# Patient Record
Sex: FEMALE | Race: WHITE
[De-identification: names, ages, dates, MRNs, and addresses within clinical notes are randomized per-mention and may not be internally consistent; named-entity substitution may affect disease eponyms.]

---

## 2017-10-17 ENCOUNTER — HOSPITAL ENCOUNTER (OUTPATIENT)
Dept: HOSPITAL 33 - SDC | Age: 72
Discharge: HOME | End: 2017-10-17
Attending: OPHTHALMOLOGY
Payer: MEDICARE

## 2017-10-17 VITALS — DIASTOLIC BLOOD PRESSURE: 65 MMHG | HEART RATE: 65 BPM | SYSTOLIC BLOOD PRESSURE: 104 MMHG

## 2017-10-17 VITALS — OXYGEN SATURATION: 94 %

## 2017-10-17 DIAGNOSIS — H25.9: Primary | ICD-10-CM

## 2017-10-17 PROCEDURE — 66982 XCAPSL CTRC RMVL CPLX WO ECP: CPT

## 2017-10-17 PROCEDURE — 67005 PARTIAL REMOVAL OF EYE FLUID: CPT

## 2017-10-17 PROCEDURE — 08RK3JZ REPLACEMENT OF LEFT LENS WITH SYNTHETIC SUBSTITUTE, PERCUTANEOUS APPROACH: ICD-10-PCS | Performed by: OPHTHALMOLOGY

## 2017-10-17 PROCEDURE — 00142 ANES PX ON EYE LENS SURGERY: CPT

## 2017-10-17 PROCEDURE — 99100 ANES PT EXTEME AGE<1 YR&>70: CPT

## 2017-10-17 PROCEDURE — 08B53ZZ EXCISION OF LEFT VITREOUS, PERCUTANEOUS APPROACH: ICD-10-PCS | Performed by: OPHTHALMOLOGY

## 2017-10-17 NOTE — OP
DATE/TIME OF OPERATION:   10/17/2017  1034

TIME DICTATED:   1151

PREOPERATIVE DIAGNOSIS:    Senile cataract of left eye.  

POSTOPERATIVE DIAGNOSIS:  Senile cataract of left eye.  

SURGEON:  Milo Godinez MD

ASSISTANT:  None.

 OPERATION:  Cataract extraction of left eye with an intraocular lens implant. 

               STANDARD _____   COMPLEX __X____

ANESTHESIA:  MAC.

___X___  Monitored anesthesia care in combination with topical and intra-cameral 
anesthesia (because of the established specific risk of reflux, arrhythmias, or an anxiety 
attack associated with ocular manipulation as well as difficulty of the ophthalmologist to 
manage such potentially catastrophic events while simultaneously attempting to complete 
the surgical procedure, it was deemed necessary for the patient's safety to have an 
anesthesiologist or a nurse anesthetist present during the procedure whenever possible. 
The anesthesiologist or the nurse anesthetist was utilized to monitor and regulate the 
intravenous sedation of the patient, so the patient was cooperative, relaxed, and 
comfortable).

______ Topical anesthesia using Tetracaine eye drops together with intra cameral 
anesthesia using Lidocaine 1% MPF. The nurse was utilized to monitor the patient. 

ANESTHESIA PROVIDER:  Kvng Burks CRNA.

COMPLICATIONS:  None.

BLOOD LOSS:  None.

INDICATIONS:  The patient is undergoing cataract surgery in the hopes of eliminating the 
visual complaints and difficulty. 

PROCEDURE:  After arriving at the facility's outpatient surgery area, an IV was started; 
the patient was given 5 mg of p.o. Versed. (If an anesthesia provider was not monitoring 
the patient) The patient was then given topical anesthetic Tetracaine eye drops. A cotton 
pellet was soaked into a solution of a combination of Zymaxid 0.5%, Kolby-Synephrine 2.5% 
and Ocufen (other drops might have been substituted referenced in the patient's record). 
The pellet was inserted by the RN into the lower conjunctival cul-de-sac with a sterile 
forceps and left for 20 minutes. The pellet was then removed by the RN with a sterile 
forceps before taking the patient to the operating room. The preoperative area nurse 
identified the patient and marked the correct eye to be operated on.

 I identified the correct eye to be operated on and marked it appropriately in the 
outpatient surgery area.

The patient was then taken into the operating room. Tetracaine eye drops were installed 
again in the correct eye. The eyelids and the lashes and the lid margins were scrubbed 
with Betadine solution. One drop of the diluted Betadine solution was placed in the 
conjunctival cul-de-sac for 45 seconds and then was irrigated. A drop of Tetracaine Gel 
was placed in the conjunctival cul-de-sac. The patient's forehead was taped to secure it 
during the procedure. The patient was monitored.  

The patient was then draped in the usual way for this procedure. An eye speculum was used 
to separate the eyelids. The eye was then fixated and a temporal 2.5 mm incision was made 
in the clear cornea temporally at the limbus. Through the incision, 0.25 cc of 1% 
non-preserved lidocaine was injected into the anterior chamber for intracameral 
anesthesia. The anterior chamber was then filled with viscoelastic. 

_____ The pupil was small. I felt that it would be safer to mechanically dilate the 
pupil.  A Malyugin ring was used at this point which dilated the pupil. That was removed 
at the end of the procedure prior to aspiration of the viscoelastic from the anterior 
chamber and posterior to the intraocular lens implant.

_____ The cataract had a great amount of cortical changes. That rendered seeing the 
anterior capsule difficult for a safe performance of an anterior capsulotomy. I injected 
an air bubble into the anterior chamber. I then injected 1 ML of vision blue solution into 
the anterior chamber. The vision blue solution was irrigated from the anterior chamber 
after 30 seconds. The anterior capsule was stained which facilitated performing the 
anterior capsulotomy safely. 

 After that was completed, a cystotome was introduced into the anterior chamber and a 
round anterior capsulotomy was performed. The capsule was removed by a forceps.

Hydrodissection was next carried utilizing a 25-gauge cannula and balanced salt solution 
to delineate the cortical material from the capsule and the nucleus from the cortical 
material. The nucleus was rotated freely into the capsular bag with no difficulty.

The phaco tip of the [employer] Phacoemulsifier was introduced into the anterior 
chamber and two grooves were made into the nucleus 90 degrees apart. Using two spatulas 
resulted into the nucleus being fractured into four quadrants. The phaco tip was then used 
to remove each quadrant of the nucleus.

Viscoelastic was used during this process to protect the corneal endothelium. 

Once the entire nucleus was removed, the phaco tip then was removed and the irrigation tip 
was introduced into the eye and the cortex was removed. The posterior capsule was 
polished. 

______ It was noticed that there was a tear into the posterior capsule with few vitreous 
strands into the pupil plan. An anterior vitrectomy was performed.

A 16.50 diopter, SN60WF, posterior chamber lens implant, was inspected and found to be 
grossly normal. The implant was inserted into the implant injector cartridge; Viscoelastic 
again was introduced into the anterior chamber, which filled the capsular bag.  The 
implant injector's cartridge tip was placed at the limbal wound and the posterior chamber 
implant was released into the capsular bag and rotated appropriately. The implant was 
found to be into the capsular bag and it was centered.

_____  0.2 ml of Tri-Moxi was introduced via 27 gauge cannula into the vitreous cavity 
through the ciliary processes.  

Viscoelastic was aspirated from the anterior chamber and posterior to the intraocular lens 
implant from the capsular bag using the irrigating tip.  

The anterior chamber was irrigated and filled with 5 cc antibiotic solution (500 cc of BSS 
plus 2 ml of Fortaz 100 mg/ml) ( if patient was not allergic to the medication). The lips 
of the corneal incision were hydrated using BSS solution. The anterior chamber was checked 
and found to be water tight. 

___X___ One drop each of antibiotic, steroid and NSAID drops (refer to chart for drops 
used) were placed in the conjunctival cul-de-sac of the operated eye.

Patient tolerated the procedure quite well and left the operating room in satisfactory 
condition.  

NOTE: Right removing viscoelastic at the end of the procedure it was noticed that there 
were some broken ciliary prosthesis which led to a slight wrinkling of the posterior 
capsule inferiorly so I inserted the capsular tension ring to stabilize the capsule. The 
capsule stabilized and the procedure was finished. 

DISCHARGE SUMMARY:  The patient was released in stable condition. The patient and those 
with the patient were given an instruction sheet as of how to care for the eye after 
surgery as well as counseling on any abnormal laboratory studies by the postoperative RN.

The patient was also given an appointment card for follow-up in the office and is to call 
immediately for any difficulties including but not limited to pain in the eye, decreased 
vision, discharge from the eye, headache and or fever. 

DISCHARGE DIAGNOSIS: Pseudophakia of left eye.

## 2017-11-21 ENCOUNTER — HOSPITAL ENCOUNTER (OUTPATIENT)
Dept: HOSPITAL 33 - SDC | Age: 72
Discharge: HOME | End: 2017-11-21
Attending: OPHTHALMOLOGY
Payer: MEDICARE

## 2017-11-21 VITALS — DIASTOLIC BLOOD PRESSURE: 68 MMHG | SYSTOLIC BLOOD PRESSURE: 156 MMHG | OXYGEN SATURATION: 100 % | HEART RATE: 57 BPM

## 2017-11-21 DIAGNOSIS — H25.9: Primary | ICD-10-CM

## 2017-11-21 DIAGNOSIS — K21.9: ICD-10-CM

## 2017-11-21 DIAGNOSIS — J44.9: ICD-10-CM

## 2017-11-21 DIAGNOSIS — Z79.899: ICD-10-CM

## 2017-11-21 DIAGNOSIS — J45.909: ICD-10-CM

## 2017-11-21 PROCEDURE — 66984 XCAPSL CTRC RMVL W/O ECP: CPT

## 2017-11-21 PROCEDURE — 08RJ3JZ REPLACEMENT OF RIGHT LENS WITH SYNTHETIC SUBSTITUTE, PERCUTANEOUS APPROACH: ICD-10-PCS | Performed by: OPHTHALMOLOGY

## 2017-11-21 PROCEDURE — 67005 PARTIAL REMOVAL OF EYE FLUID: CPT

## 2017-11-21 PROCEDURE — 00142 ANES PX ON EYE LENS SURGERY: CPT

## 2017-11-21 PROCEDURE — 08B43ZZ EXCISION OF RIGHT VITREOUS, PERCUTANEOUS APPROACH: ICD-10-PCS | Performed by: OPHTHALMOLOGY

## 2017-11-21 PROCEDURE — 99100 ANES PT EXTEME AGE<1 YR&>70: CPT

## 2017-11-21 RX ADMIN — TETRACAINE HYDROCHLORIDE ONE ML: 5 SOLUTION OPHTHALMIC at 09:28

## 2017-11-21 RX ADMIN — ACETAZOLAMIDE ONE MG: 250 TABLET ORAL at 11:31

## 2017-11-21 RX ADMIN — ACETAZOLAMIDE ONE MG: 250 TABLET ORAL at 09:19

## 2017-11-21 RX ADMIN — TETRACAINE HYDROCHLORIDE ONE ML: 5 SOLUTION OPHTHALMIC at 08:59

## 2017-11-21 NOTE — OP
DATE/TIME OF OPERATION:   11/21/2017 1034

TIME DICTATED:  1202

PREOPERATIVE DIAGNOSIS:    Senile cataract of right eye.  

POSTOPERATIVE DIAGNOSIS:  Senile cataract of right eye.  

SURGEON:  Milo Godinez MD

ASSISTANT:  None.

 OPERATION:  Cataract extraction of right eye with an intraocular lens implant. 

               STANDARD __X___   COMPLEX ______

ANESTHESIA:  MAC.

___X___  Monitored anesthesia care in combination with topical and intra-cameral 
anesthesia (because of the established specific risk of reflux, arrhythmias, or an anxiety 
attack associated with ocular manipulation as well as difficulty of the ophthalmologist to 
manage such potentially catastrophic events while simultaneously attempting to complete 
the surgical procedure, it was deemed necessary for the patient's safety to have an 
anesthesiologist or a nurse anesthetist present during the procedure whenever possible. 
The anesthesiologist or the nurse anesthetist was utilized to monitor and regulate the 
intravenous sedation of the patient, so the patient was cooperative, relaxed, and 
comfortable).

______ Topical anesthesia using Tetracaine eye drops together with intra cameral 
anesthesia using Lidocaine 1% MPF. The nurse was utilized to monitor the patient. 

ANESTHESIA PROVIDER:  Wu Torres CRNA.

COMPLICATIONS:  None.

BLOOD LOSS:  None.

INDICATIONS:  The patient is undergoing cataract surgery in the hopes of eliminating the 
visual complaints and difficulty. 

PROCEDURE:  After arriving at the facility's outpatient surgery area, an IV was started; 
the patient was given 5 mg of p.o. Versed. (If an anesthesia provider was not monitoring 
the patient) The patient was then given topical anesthetic Tetracaine eye drops. A cotton 
pellet was soaked into a solution of a combination of Zymaxid 0.5%, Kolby-Synephrine 2.5% 
and Ocufen (other drops might have been substituted referenced in the patient's record). 
The pellet was inserted by the RN into the lower conjunctival cul-de-sac with a sterile 
forceps and left for 20 minutes. The pellet was then removed by the RN with a sterile 
forceps before taking the patient to the operating room. The preoperative area nurse 
identified the patient and marked the correct eye to be operated on.

 I identified the correct eye to be operated on and marked it appropriately in the 
outpatient surgery area.

The patient was then taken into the operating room. Tetracaine eye drops were installed 
again in the correct eye. The eyelids and the lashes and the lid margins were scrubbed 
with Betadine solution. One drop of the diluted Betadine solution was placed in the 
conjunctival cul-de-sac for 45 seconds and then was irrigated. A drop of Tetracaine Gel 
was placed in the conjunctival cul-de-sac. The patient's forehead was taped to secure it 
during the procedure. The patient was monitored.  

The patient was then draped in the usual way for this procedure. An eye speculum was used 
to separate the eyelids. The eye was then fixated and a temporal 2.5 mm incision was made 
in the clear cornea temporally at the limbus. Through the incision, 0.25 cc of 1% 
non-preserved lidocaine was injected into the anterior chamber for intracameral 
anesthesia. The anterior chamber was then filled with viscoelastic. 

_____ The pupil was small. I felt that it would be safer to mechanically dilate the 
pupil.  A Malyugin ring was used at this point which dilated the pupil. That was removed 
at the end of the procedure prior to aspiration of the viscoelastic from the anterior 
chamber and posterior to the intraocular lens implant.

_____ The cataract had a great amount of cortical changes. That rendered seeing the 
anterior capsule difficult for a safe performance of an anterior capsulotomy. I injected 
an air bubble into the anterior chamber. I then injected 1 ML of vision blue solution into 
the anterior chamber. The vision blue solution was irrigated from the anterior chamber 
after 30 seconds. The anterior capsule was stained which facilitated performing the 
anterior capsulotomy safely. 

 After that was completed, a cystotome was introduced into the anterior chamber and a 
round anterior capsulotomy was performed. The capsule was removed by a forceps.

Hydrodissection was next carried utilizing a 25-gauge cannula and balanced salt solution 
to delineate the cortical material from the capsule and the nucleus from the cortical 
material. The nucleus was rotated freely into the capsular bag with no difficulty.

The phaco tip of the [employer] Phacoemulsifier was introduced into the anterior 
chamber and two grooves were made into the nucleus 90 degrees apart. Using two spatulas 
resulted into the nucleus being fractured into four quadrants. The phaco tip was then used 
to remove each quadrant of the nucleus.

Viscoelastic was used during this process to protect the corneal endothelium. 

Once the entire nucleus was removed, the phaco tip then was removed and the irrigation tip 
was introduced into the eye and the cortex was removed. The posterior capsule was 
polished. 

______ It was noticed that there was a tear into the posterior capsule with few vitreous 
strands into the pupil plan. An anterior vitrectomy was performed.

A 17.50 diopter, SN60WF, posterior chamber lens implant, was inspected and found to be 
grossly normal. The implant was inserted into the implant injector cartridge; Viscoelastic 
again was introduced into the anterior chamber, which filled the capsular bag.  The 
implant injector's cartridge tip was placed at the limbal wound and the posterior chamber 
implant was released into the capsular bag and rotated appropriately. The implant was 
found to be into the capsular bag and it was centered.

_____  0.2 ml of Tri-Moxi was introduced via 27 gauge cannula into the vitreous cavity 
through the ciliary processes.  

Viscoelastic was aspirated from the anterior chamber and posterior to the intraocular lens 
implant from the capsular bag using the irrigating tip.  

The anterior chamber was irrigated and filled with 5 cc antibiotic solution (500 cc of BSS 
plus 2 ml of Fortaz 100 mg/ml) ( if patient was not allergic to the medication). The lips 
of the corneal incision were hydrated using BSS solution. The anterior chamber was checked 
and found to be water tight. 

___X___ One drop each of antibiotic, steroid and NSAID drops (refer to chart for drops 
used) were placed in the conjunctival cul-de-sac of the operated eye.

Patient tolerated the procedure quite well and left the operating room in satisfactory 
condition.  

DISCHARGE SUMMARY:  The patient was released in stable condition. The patient and those 
with the patient were given an instruction sheet as of how to care for the eye after 
surgery as well as counseling on any abnormal laboratory studies by the postoperative RN.

The patient was also given an appointment card for follow-up in the office and is to call 
immediately for any difficulties including but not limited to pain in the eye, decreased 
vision, discharge from the eye, headache and or fever. 

DISCHARGE DIAGNOSIS: Pseudophakia of right eye.

## 2019-02-26 ENCOUNTER — HOSPITAL ENCOUNTER (OUTPATIENT)
Dept: HOSPITAL 33 - SDC | Age: 74
Discharge: HOME | End: 2019-02-26
Attending: FAMILY MEDICINE
Payer: MEDICARE

## 2019-02-26 VITALS — OXYGEN SATURATION: 98 %

## 2019-02-26 VITALS — HEART RATE: 61 BPM | SYSTOLIC BLOOD PRESSURE: 153 MMHG | DIASTOLIC BLOOD PRESSURE: 74 MMHG

## 2019-02-26 DIAGNOSIS — Z87.19: ICD-10-CM

## 2019-02-26 DIAGNOSIS — K57.30: ICD-10-CM

## 2019-02-26 DIAGNOSIS — K29.70: Primary | ICD-10-CM

## 2019-02-26 PROCEDURE — 99100 ANES PT EXTEME AGE<1 YR&>70: CPT

## 2019-02-26 NOTE — OP
SURGERY DATE/TIME:    02/26/2019  0700



PREOPERATIVE DIAGNOSES:    

1) History of gastroesophageal reflux disease. 

2) History of colon polyps.



POSTOPERATIVE DIAGNOSES:      

1) Mild gastritis. 

2) Scattered diverticulosis. 



PROCEDURES:    

1) Esophagogastroduodenoscopy with biopsy.

2) Colonoscopy.



SURGEON:  Dr. Wall.



ANESTHESIA:  Medications were given by the anesthesia department. 

 

BRIEF HISTORY: The patient is a 73 year old white female who reports she has a long 
history of gastroesophageal reflux which is episodic at times getting worse and the 
getting better. She also reports a history of colon polyps. Her last evaluation was more 
than five years ago. The patient was felt the need to have endoscopic evaluation. She was 
appraised of the risks of the procedure including the risk of perforation, phlebitis, 
untoward reaction to medication, bleeding and missed lesions. The patient verbalized her 
understanding and desired to have the procedure performed.



DESCRIPTION OF PROCEDURE:    The patient was given the medications by the anesthesia 
department. She had continuous pulse oximetry, ECG monitoring, intermittent blood pressure 
monitoring and tidal CO2 monitoring during the examination. She was placed in the left 
lateral decubitus position. A bite block was placed and the flexible Olympus gastroscope 
was used to intubate the oropharynx. A view of the larynx was obtained and was normal. The 
scope was easily introduced in the esophagus which appeared to be normal throughout its 
length.  The stomach was entered where normal gastric rugal folds were seen and these 
distended nicely with insufflation of air. The scope was passed along the greater 
curvature of the stomach to the antrum. The pylorus encountered and intubated. Duodenum 
inspected and found to be normal. The scope is withdrawn towards the stomach. Again, a 
retroflex view was obtained of the lesser curvature, fundus and cardia regions of the 
stomach and these appeared normal.  There was a generalized appearance of erythema 
throughout the stomach but no erosions or ulcerations. Biopsies were obtained with cold 
biopsy technique in the antrum to rule out the presence of Helicobacter pylori-type 
organisms. The scope was then removed from the patient. 



Next, a digital rectal exam was performed and revealed normal anal sphincter tone and no 
masses. The flexible Olympus pediatric colonoscope was used to intubate the rectum.  A 
view of the colon was developed sequentially to the cecum. Upon insertion and withdrawal, 
including a retroflex view in the rectum and noted a few scattered diverticula throughout 
the colon. No mucosal lesions otherwise were encountered.  The scope was removed from the 
patient who tolerated the procedure well and was sent back to OP recovery in good 
condition. The prep was noted to be fair to good.

## 2019-09-26 ENCOUNTER — HOSPITAL ENCOUNTER (OUTPATIENT)
Dept: HOSPITAL 33 - SDC | Age: 74
Discharge: HOME | End: 2019-09-26
Attending: SURGERY
Payer: MEDICARE

## 2019-09-26 VITALS — SYSTOLIC BLOOD PRESSURE: 127 MMHG | DIASTOLIC BLOOD PRESSURE: 61 MMHG

## 2019-09-26 VITALS — HEART RATE: 78 BPM | OXYGEN SATURATION: 98 %

## 2019-09-26 DIAGNOSIS — C44.301: Primary | ICD-10-CM

## 2019-09-26 PROCEDURE — 99100 ANES PT EXTEME AGE<1 YR&>70: CPT

## 2019-09-27 NOTE — OP
SURGERY DATE/TIME:  09/26/2019  1227    



PREOPERATIVE DIAGNOSIS:    A 1 cm nasal cancer. 



POSTOPERATIVE DIAGNOSIS:  A 1 cm nasal cancer. 



PROCEDURES:

1) Excision of 1 cm nasal cancer elliptically.

2) Split thickness skin graft harvest and application 1 sq/cm. 



SURGEON:        Aaron Bowling M.D.



ANESTHESIA:    MAC.



COMPLICATIONS:    None.



CONDITION:        Stable.



INDICATION:  A patient requiring above stated procedure. 



DESCRIPTION OF PROCEDURE: Taken to surgery. Routine prep and drape. Elliptical excision 
with 1 mm margins. It was elevated. It was lifted off with electrocautery needle tip. 
Hemostasis satisfactory. Split thickness skin graft 14,000ths of an inch was harvested 
from the neck and then applied with suture #4-0 chromic. Sterile ointment applied. Sterile 
dressing applied. The patient tolerated the procedure satisfactorily.

## 2021-09-22 ENCOUNTER — HOSPITAL ENCOUNTER (OUTPATIENT)
Dept: HOSPITAL 33 - SDC-PAIN | Age: 76
Discharge: HOME | End: 2021-09-22
Attending: PSYCHIATRY & NEUROLOGY
Payer: MEDICARE

## 2021-09-22 DIAGNOSIS — Z79.899: ICD-10-CM

## 2021-09-22 DIAGNOSIS — M47.816: Primary | ICD-10-CM

## 2021-09-22 PROCEDURE — 64494 INJ PARAVERT F JNT L/S 2 LEV: CPT

## 2021-09-22 PROCEDURE — 72020 X-RAY EXAM OF SPINE 1 VIEW: CPT

## 2021-09-22 PROCEDURE — 77002 NEEDLE LOCALIZATION BY XRAY: CPT

## 2021-09-22 PROCEDURE — 64493 INJ PARAVERT F JNT L/S 1 LEV: CPT

## 2021-09-25 NOTE — XRAY
Indication: Bilateral L4-S1 MBB.



Intraoperative fluoroscopy was provided for 16 seconds.  A single digital spot

image submitted for interpretation demonstrates posterior needle tips

projected over the expected left and right L4-S1 nerve roots.  Correlate with

intraoperative findings/report.

## 2021-10-20 ENCOUNTER — HOSPITAL ENCOUNTER (OUTPATIENT)
Dept: HOSPITAL 33 - SDC-PAIN | Age: 76
Discharge: HOME | End: 2021-10-20
Attending: PSYCHIATRY & NEUROLOGY
Payer: MEDICARE

## 2021-10-20 DIAGNOSIS — Z79.899: ICD-10-CM

## 2021-10-20 DIAGNOSIS — M47.816: Primary | ICD-10-CM

## 2021-10-20 PROCEDURE — 64494 INJ PARAVERT F JNT L/S 2 LEV: CPT

## 2021-10-20 PROCEDURE — 64493 INJ PARAVERT F JNT L/S 1 LEV: CPT

## 2021-10-20 PROCEDURE — 77002 NEEDLE LOCALIZATION BY XRAY: CPT

## 2021-10-20 PROCEDURE — 72020 X-RAY EXAM OF SPINE 1 VIEW: CPT

## 2021-10-20 NOTE — XRAY
Indication: Bilateral L4-S1 MBB.



Intraoperative fluoroscopy provided for 21 seconds.  Single digital spot image

submitted for interpretation demonstrates posterior needle tips projecting

over the expected left and right L4-S1 nerve roots.  Correlate with

intraoperative findings/report.

## 2021-12-22 ENCOUNTER — HOSPITAL ENCOUNTER (OUTPATIENT)
Dept: HOSPITAL 33 - SDC-PAIN | Age: 76
Discharge: HOME | End: 2021-12-22
Attending: PSYCHIATRY & NEUROLOGY
Payer: MEDICARE

## 2021-12-22 DIAGNOSIS — Z79.899: ICD-10-CM

## 2021-12-22 DIAGNOSIS — M47.816: Primary | ICD-10-CM

## 2021-12-22 PROCEDURE — 64636 DESTROY L/S FACET JNT ADDL: CPT

## 2021-12-22 PROCEDURE — 64635 DESTROY LUMB/SAC FACET JNT: CPT

## 2021-12-22 PROCEDURE — 77002 NEEDLE LOCALIZATION BY XRAY: CPT

## 2021-12-22 PROCEDURE — 72100 X-RAY EXAM L-S SPINE 2/3 VWS: CPT

## 2021-12-22 PROCEDURE — 99100 ANES PT EXTEME AGE<1 YR&>70: CPT

## 2021-12-22 NOTE — XRAY
Indication: Left L4-S1 RFA.



Intraoperative fluoroscopy provided for 36 seconds.  3 digital spot image

submitted for interpretation demonstrates posterior needle tips projecting

over the expected left L4-S1 nerve roots.  Correlate with intraoperative

findings/report.

## 2021-12-29 ENCOUNTER — HOSPITAL ENCOUNTER (OUTPATIENT)
Dept: HOSPITAL 33 - SDC-PAIN | Age: 76
Discharge: HOME | End: 2021-12-29
Attending: PSYCHIATRY & NEUROLOGY
Payer: MEDICARE

## 2021-12-29 DIAGNOSIS — Z79.899: ICD-10-CM

## 2021-12-29 DIAGNOSIS — M47.816: Primary | ICD-10-CM

## 2021-12-29 PROCEDURE — 77002 NEEDLE LOCALIZATION BY XRAY: CPT

## 2021-12-29 PROCEDURE — 64636 DESTROY L/S FACET JNT ADDL: CPT

## 2021-12-29 PROCEDURE — 72100 X-RAY EXAM L-S SPINE 2/3 VWS: CPT

## 2021-12-29 PROCEDURE — 64635 DESTROY LUMB/SAC FACET JNT: CPT

## 2021-12-29 NOTE — XRAY
Indication: Right L4-S1 RFA.



Intraoperative fluoroscopy provided for 26 seconds.  3 digital spot image

submitted for interpretation demonstrates posterior needle tips projecting

over the expected right L4-S1 nerve roots.  Correlate with intraoperative

findings/report.